# Patient Record
Sex: MALE | Race: BLACK OR AFRICAN AMERICAN | NOT HISPANIC OR LATINO | Employment: OTHER | URBAN - METROPOLITAN AREA
[De-identification: names, ages, dates, MRNs, and addresses within clinical notes are randomized per-mention and may not be internally consistent; named-entity substitution may affect disease eponyms.]

---

## 2017-03-10 RX ORDER — HYDROCHLOROTHIAZIDE 12.5 MG/1
12.5 CAPSULE, GELATIN COATED ORAL DAILY
Qty: 90 CAPSULE | Refills: 4 | Status: SHIPPED | OUTPATIENT
Start: 2017-03-10 | End: 2020-12-30 | Stop reason: SDUPTHER

## 2017-03-10 RX ORDER — LOSARTAN POTASSIUM 50 MG/1
50 TABLET ORAL DAILY
Qty: 90 TABLET | Refills: 4 | Status: SHIPPED | OUTPATIENT
Start: 2017-03-10 | End: 2017-11-16 | Stop reason: SDUPTHER

## 2017-05-11 ENCOUNTER — OFFICE VISIT (OUTPATIENT)
Dept: ONCOLOGY | Facility: CLINIC | Age: 62
End: 2017-05-11

## 2017-05-11 ENCOUNTER — LAB (OUTPATIENT)
Dept: LAB | Facility: HOSPITAL | Age: 62
End: 2017-05-11

## 2017-05-11 VITALS
WEIGHT: 173 LBS | BODY MASS INDEX: 23.43 KG/M2 | SYSTOLIC BLOOD PRESSURE: 104 MMHG | HEART RATE: 77 BPM | TEMPERATURE: 97.2 F | DIASTOLIC BLOOD PRESSURE: 81 MMHG | RESPIRATION RATE: 16 BRPM | HEIGHT: 72 IN

## 2017-05-11 DIAGNOSIS — C84.10 SEZARY'S DISEASE, UNSPECIFIED BODY REGION (HCC): ICD-10-CM

## 2017-05-11 DIAGNOSIS — C84.10 SEZARY'S DISEASE, UNSPECIFIED BODY REGION (HCC): Primary | ICD-10-CM

## 2017-05-11 LAB
ALBUMIN SERPL-MCNC: 3.8 G/DL (ref 3.2–4.8)
ALBUMIN/GLOB SERPL: 1.5 G/DL (ref 1.5–2.5)
ALP SERPL-CCNC: 62 U/L (ref 25–100)
ALT SERPL W P-5'-P-CCNC: 14 U/L (ref 7–40)
ANION GAP SERPL CALCULATED.3IONS-SCNC: 6 MMOL/L (ref 3–11)
AST SERPL-CCNC: 19 U/L (ref 0–33)
BILIRUB SERPL-MCNC: 0.7 MG/DL (ref 0.3–1.2)
BUN BLD-MCNC: 8 MG/DL (ref 9–23)
BUN/CREAT SERPL: 10 (ref 7–25)
CALCIUM SPEC-SCNC: 9.7 MG/DL (ref 8.7–10.4)
CHLORIDE SERPL-SCNC: 104 MMOL/L (ref 99–109)
CO2 SERPL-SCNC: 29 MMOL/L (ref 20–31)
CREAT BLD-MCNC: 0.8 MG/DL (ref 0.6–1.3)
ERYTHROCYTE [DISTWIDTH] IN BLOOD BY AUTOMATED COUNT: 14.2 % (ref 11.3–14.5)
GFR SERPL CREATININE-BSD FRML MDRD: 118 ML/MIN/1.73
GLOBULIN UR ELPH-MCNC: 2.5 GM/DL
GLUCOSE BLD-MCNC: 86 MG/DL (ref 70–100)
HCT VFR BLD AUTO: 37.6 % (ref 38.9–50.9)
HGB BLD-MCNC: 12.1 G/DL (ref 13.1–17.5)
LYMPHOCYTES # BLD AUTO: 1.2 10*3/MM3 (ref 0.6–4.8)
LYMPHOCYTES NFR BLD AUTO: 23.6 % (ref 24–44)
MCH RBC QN AUTO: 29.9 PG (ref 27–31)
MCHC RBC AUTO-ENTMCNC: 32.2 G/DL (ref 32–36)
MCV RBC AUTO: 92.7 FL (ref 80–99)
MONOCYTES # BLD AUTO: 0.4 10*3/MM3 (ref 0–1)
MONOCYTES NFR BLD AUTO: 8.3 % (ref 0–12)
NEUTROPHILS # BLD AUTO: 3.3 10*3/MM3 (ref 1.5–8.3)
NEUTROPHILS NFR BLD AUTO: 68.1 % (ref 41–71)
PLATELET # BLD AUTO: 238 10*3/MM3 (ref 150–450)
PMV BLD AUTO: 7.8 FL (ref 6–12)
POTASSIUM BLD-SCNC: 4 MMOL/L (ref 3.5–5.5)
PROT SERPL-MCNC: 6.3 G/DL (ref 5.7–8.2)
RBC # BLD AUTO: 4.05 10*6/MM3 (ref 4.2–5.76)
SODIUM BLD-SCNC: 139 MMOL/L (ref 132–146)
WBC NRBC COR # BLD: 4.9 10*3/MM3 (ref 3.5–10.8)

## 2017-05-11 PROCEDURE — 85025 COMPLETE CBC W/AUTO DIFF WBC: CPT

## 2017-05-11 PROCEDURE — 99213 OFFICE O/P EST LOW 20 MIN: CPT | Performed by: INTERNAL MEDICINE

## 2017-05-11 PROCEDURE — 36415 COLL VENOUS BLD VENIPUNCTURE: CPT

## 2017-05-11 PROCEDURE — 80053 COMPREHEN METABOLIC PANEL: CPT | Performed by: INTERNAL MEDICINE

## 2017-05-11 RX ORDER — DORZOLAMIDE HYDROCHLORIDE AND TIMOLOL MALEATE 20; 5 MG/ML; MG/ML
SOLUTION/ DROPS OPHTHALMIC
Refills: 1 | COMMUNITY
Start: 2017-03-21

## 2017-11-16 ENCOUNTER — APPOINTMENT (OUTPATIENT)
Dept: LAB | Facility: HOSPITAL | Age: 62
End: 2017-11-16

## 2017-11-16 ENCOUNTER — OFFICE VISIT (OUTPATIENT)
Dept: ONCOLOGY | Facility: CLINIC | Age: 62
End: 2017-11-16

## 2017-11-16 VITALS
WEIGHT: 178 LBS | DIASTOLIC BLOOD PRESSURE: 91 MMHG | SYSTOLIC BLOOD PRESSURE: 167 MMHG | BODY MASS INDEX: 24.11 KG/M2 | HEART RATE: 82 BPM | RESPIRATION RATE: 16 BRPM | HEIGHT: 72 IN | TEMPERATURE: 97.7 F

## 2017-11-16 DIAGNOSIS — C84.10 SEZARY'S DISEASE, UNSPECIFIED BODY REGION (HCC): Primary | ICD-10-CM

## 2017-11-16 LAB
ALBUMIN SERPL-MCNC: 4.1 G/DL (ref 3.2–4.8)
ALBUMIN/GLOB SERPL: 1.5 G/DL (ref 1.5–2.5)
ALP SERPL-CCNC: 105 U/L (ref 25–100)
ALT SERPL W P-5'-P-CCNC: 18 U/L (ref 7–40)
ANION GAP SERPL CALCULATED.3IONS-SCNC: 6 MMOL/L (ref 3–11)
AST SERPL-CCNC: 23 U/L (ref 0–33)
BILIRUB SERPL-MCNC: 0.6 MG/DL (ref 0.3–1.2)
BUN BLD-MCNC: 10 MG/DL (ref 9–23)
BUN/CREAT SERPL: 11.1 (ref 7–25)
CALCIUM SPEC-SCNC: 9.1 MG/DL (ref 8.7–10.4)
CHLORIDE SERPL-SCNC: 107 MMOL/L (ref 99–109)
CO2 SERPL-SCNC: 27 MMOL/L (ref 20–31)
CREAT BLD-MCNC: 0.9 MG/DL (ref 0.6–1.3)
ERYTHROCYTE [DISTWIDTH] IN BLOOD BY AUTOMATED COUNT: 13.3 % (ref 11.3–14.5)
GFR SERPL CREATININE-BSD FRML MDRD: 103 ML/MIN/1.73
GLOBULIN UR ELPH-MCNC: 2.8 GM/DL
GLUCOSE BLD-MCNC: 76 MG/DL (ref 70–100)
HCT VFR BLD AUTO: 42 % (ref 38.9–50.9)
HGB BLD-MCNC: 13.4 G/DL (ref 13.1–17.5)
LDH SERPL-CCNC: 193 U/L (ref 120–246)
LYMPHOCYTES # BLD AUTO: 1.2 10*3/MM3 (ref 0.6–4.8)
LYMPHOCYTES NFR BLD AUTO: 24.1 % (ref 24–44)
MCH RBC QN AUTO: 29.2 PG (ref 27–31)
MCHC RBC AUTO-ENTMCNC: 31.8 G/DL (ref 32–36)
MCV RBC AUTO: 91.7 FL (ref 80–99)
MONOCYTES # BLD AUTO: 0.2 10*3/MM3 (ref 0–1)
MONOCYTES NFR BLD AUTO: 3 % (ref 0–12)
NEUTROPHILS # BLD AUTO: 3.6 10*3/MM3 (ref 1.5–8.3)
NEUTROPHILS NFR BLD AUTO: 72.9 % (ref 41–71)
PLATELET # BLD AUTO: 194 10*3/MM3 (ref 150–450)
PMV BLD AUTO: 7.9 FL (ref 6–12)
POTASSIUM BLD-SCNC: 4.2 MMOL/L (ref 3.5–5.5)
PROT SERPL-MCNC: 6.9 G/DL (ref 5.7–8.2)
RBC # BLD AUTO: 4.58 10*6/MM3 (ref 4.2–5.76)
SODIUM BLD-SCNC: 140 MMOL/L (ref 132–146)
WBC NRBC COR # BLD: 5 10*3/MM3 (ref 3.5–10.8)

## 2017-11-16 PROCEDURE — 36415 COLL VENOUS BLD VENIPUNCTURE: CPT | Performed by: INTERNAL MEDICINE

## 2017-11-16 PROCEDURE — 99213 OFFICE O/P EST LOW 20 MIN: CPT | Performed by: INTERNAL MEDICINE

## 2017-11-16 PROCEDURE — 80053 COMPREHEN METABOLIC PANEL: CPT | Performed by: INTERNAL MEDICINE

## 2017-11-16 PROCEDURE — 83615 LACTATE (LD) (LDH) ENZYME: CPT | Performed by: INTERNAL MEDICINE

## 2017-11-16 PROCEDURE — 85025 COMPLETE CBC W/AUTO DIFF WBC: CPT | Performed by: INTERNAL MEDICINE

## 2017-11-16 RX ORDER — LOSARTAN POTASSIUM 50 MG/1
50 TABLET ORAL DAILY
Qty: 90 TABLET | Refills: 4 | Status: SHIPPED | OUTPATIENT
Start: 2017-11-16 | End: 2019-01-02 | Stop reason: SDUPTHER

## 2017-11-16 NOTE — PROGRESS NOTES
"PROBLEM LIST:  Oncology/Hematology History    1. 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   2. Completed radiotherapy to the whole body by Dr. Lujan at the [facility] in 01/2014.   3. Progressed on methotrexate and weekly dexamethasone.   4. Completed 4 months of bexarotene with continued response.   5. Skin changes related to treatment.        Erythroderma, Sezary    10/14/2013 Initial Diagnosis     Erythroderma, Sezary            REASON FOR VISIT: Follow-up of my T-cell lymphoma    HISTORY OF PRESENT ILLNESS:   62-year-old gentleman with Sezary syndrome presents today for follow-up.  He is  3.5 years from completion of radiotherapy.  He is now over 3 years out from completion of bexarotene.  His skin continues to improve slowly.  Otherwise clinically doing reasonably well.  Still concerned of contractures  In his hands.  Denies any issues regarding infections.  No night sweats or lymphadenopathy.      Past medical history, social history and family history was reviewed and unchanged from prior visit.    Review of Systems:    Review of Systems   Constitutional: Positive for fatigue.   HENT: Negative.    Eyes: Positive for redness.   Respiratory: Negative.    Cardiovascular: Negative.    Gastrointestinal: Negative.    Endocrine: Negative.    Genitourinary: Negative.    Musculoskeletal: Positive for arthralgias.   Skin: Positive for color change and wound.   Allergic/Immunologic: Negative.    Neurological: Negative.    Hematological: Negative.       A comprehensive 14 point review of systems was performed and was negative except as mentioned.      Medications:  The current medication list was reviewed in the EMR    ALLERGIES:  No Known Allergies      Physical Exam    VITAL SIGNS:  /91 Comment: LUE  Pulse 82  Temp 97.7 °F (36.5 °C) (Temporal Artery )   Resp 16  Ht 72\" (182.9 cm)  Wt 178 lb (80.7 kg)  BMI 24.14 kg/m2     Performance " Status: 1    General:  in no acute distress  HEENT: sclera anicteric, oropharynx clear, neck is supple  Lymphatics: no cervical, supraclavicular, or axillary adenopathy  Cardiovascular: regular rate and rhythm, no murmurs, rubs or gallops  Lungs: clear to auscultation bilaterally  Abdomen: soft, nontender, nondistended.  No palpable organomegaly  Extremities: no lower extremity edema  Skin: Significant erythroderma in his hands with peeling minimal flaking and the rest of his body.  Msk:  Shows no weakness of the large muscle groups  Psych: Mood is stable        RECENT LABS:    Lab Results   Component Value Date    WBC 5.00 11/16/2017    HGB 13.4 11/16/2017    HCT 42.0 11/16/2017    MCV 91.7 11/16/2017     11/16/2017     Lab Results   Component Value Date    GLUCOSE 76 11/16/2017    BUN 10 11/16/2017    CREATININE 0.90 11/16/2017    EGFRIFAFRI 103 11/16/2017    BCR 11.1 11/16/2017    K 4.2 11/16/2017    CO2 27.0 11/16/2017    CALCIUM 9.1 11/16/2017    ALBUMIN 4.10 11/16/2017    LABIL2 1.5 11/16/2017    AST 23 11/16/2017    ALT 18 11/16/2017     Assessment/Plan    62-year-old gentleman with Sezary syndrome.   He keeps improving. I don't see a need to start him on any treatment at this time.  Skin seems to be doing reasonably well.  He doesn't have any new areas of concern.  He does have his hands which seem to be improving though still with contractures.  I'll see him back in clinic in 6 months.      Neri Matute MD  [facility] Hematology and Oncology    11/16/2017

## 2017-12-06 ENCOUNTER — TELEPHONE (OUTPATIENT)
Dept: ONCOLOGY | Facility: CLINIC | Age: 62
End: 2017-12-06

## 2017-12-06 NOTE — TELEPHONE ENCOUNTER
----- Message from Chely Howard sent at 12/6/2017 11:37 AM EST -----  Regarding: STACY - MEDICATION VERIFICATION   Contact: [phone]  PATIENT NEEDS TO VERIFY WHAT HIS BLOOD PRESSURE MEDICINE MG SHOULD BE BECAUSE HE FEELS THE LAST RX WASN'T CORRECT.

## 2018-05-16 ENCOUNTER — LAB (OUTPATIENT)
Dept: LAB | Facility: HOSPITAL | Age: 63
End: 2018-05-16

## 2018-05-16 ENCOUNTER — OFFICE VISIT (OUTPATIENT)
Dept: ONCOLOGY | Facility: CLINIC | Age: 63
End: 2018-05-16

## 2018-05-16 VITALS
RESPIRATION RATE: 16 BRPM | BODY MASS INDEX: 25.77 KG/M2 | TEMPERATURE: 98.2 F | HEART RATE: 68 BPM | SYSTOLIC BLOOD PRESSURE: 131 MMHG | WEIGHT: 190 LBS | DIASTOLIC BLOOD PRESSURE: 84 MMHG

## 2018-05-16 DIAGNOSIS — C84.10 SEZARY'S DISEASE, UNSPECIFIED BODY REGION (HCC): ICD-10-CM

## 2018-05-16 DIAGNOSIS — C84.10 SEZARY'S DISEASE, UNSPECIFIED BODY REGION (HCC): Primary | ICD-10-CM

## 2018-05-16 LAB
ALBUMIN SERPL-MCNC: 4 G/DL (ref 3.2–4.8)
ALBUMIN/GLOB SERPL: 1.4 G/DL (ref 1.5–2.5)
ALP SERPL-CCNC: 105 U/L (ref 25–100)
ALT SERPL W P-5'-P-CCNC: 12 U/L (ref 7–40)
ANION GAP SERPL CALCULATED.3IONS-SCNC: 5 MMOL/L (ref 3–11)
AST SERPL-CCNC: 16 U/L (ref 0–33)
BILIRUB SERPL-MCNC: 0.6 MG/DL (ref 0.3–1.2)
BUN BLD-MCNC: 12 MG/DL (ref 9–23)
BUN/CREAT SERPL: 12 (ref 7–25)
CALCIUM SPEC-SCNC: 8.9 MG/DL (ref 8.7–10.4)
CHLORIDE SERPL-SCNC: 108 MMOL/L (ref 99–109)
CO2 SERPL-SCNC: 24 MMOL/L (ref 20–31)
CREAT BLD-MCNC: 1 MG/DL (ref 0.6–1.3)
ERYTHROCYTE [DISTWIDTH] IN BLOOD BY AUTOMATED COUNT: 13.4 % (ref 11.3–14.5)
GFR SERPL CREATININE-BSD FRML MDRD: 91 ML/MIN/1.73
GLOBULIN UR ELPH-MCNC: 2.9 GM/DL
GLUCOSE BLD-MCNC: 100 MG/DL (ref 70–100)
HCT VFR BLD AUTO: 43.5 % (ref 38.9–50.9)
HGB BLD-MCNC: 13.9 G/DL (ref 13.1–17.5)
LDH SERPL-CCNC: 138 U/L (ref 120–246)
LYMPHOCYTES # BLD AUTO: 1.2 10*3/MM3 (ref 0.6–4.8)
LYMPHOCYTES NFR BLD AUTO: 24.5 % (ref 24–44)
MCH RBC QN AUTO: 28.9 PG (ref 27–31)
MCHC RBC AUTO-ENTMCNC: 32 G/DL (ref 32–36)
MCV RBC AUTO: 90 FL (ref 80–99)
MONOCYTES # BLD AUTO: 0.4 10*3/MM3 (ref 0–1)
MONOCYTES NFR BLD AUTO: 7.7 % (ref 0–12)
NEUTROPHILS # BLD AUTO: 3.2 10*3/MM3 (ref 1.5–8.3)
NEUTROPHILS NFR BLD AUTO: 67.8 % (ref 41–71)
PLATELET # BLD AUTO: 234 10*3/MM3 (ref 150–450)
PMV BLD AUTO: 8.1 FL (ref 6–12)
POTASSIUM BLD-SCNC: 4.3 MMOL/L (ref 3.5–5.5)
PROT SERPL-MCNC: 6.9 G/DL (ref 5.7–8.2)
RBC # BLD AUTO: 4.83 10*6/MM3 (ref 4.2–5.76)
SODIUM BLD-SCNC: 137 MMOL/L (ref 132–146)
WBC NRBC COR # BLD: 4.7 10*3/MM3 (ref 3.5–10.8)

## 2018-05-16 PROCEDURE — 36415 COLL VENOUS BLD VENIPUNCTURE: CPT

## 2018-05-16 PROCEDURE — 80053 COMPREHEN METABOLIC PANEL: CPT

## 2018-05-16 PROCEDURE — 85025 COMPLETE CBC W/AUTO DIFF WBC: CPT

## 2018-05-16 PROCEDURE — 83615 LACTATE (LD) (LDH) ENZYME: CPT

## 2018-05-16 PROCEDURE — 99213 OFFICE O/P EST LOW 20 MIN: CPT | Performed by: INTERNAL MEDICINE

## 2018-05-16 NOTE — PROGRESS NOTES
PROBLEM LIST:  Oncology/Hematology History    1. 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   2. Completed radiotherapy to the whole body by Dr. Lujan at the [facility] in 01/2014.   3. Progressed on methotrexate and weekly dexamethasone.   4. Completed 4 months of bexarotene with continued response.   5. Skin changes related to treatment.        Erythroderma, zary    10/14/2013 Initial Diagnosis     Erythroderma, Sezary            REASON FOR VISIT: Follow-up of my T-cell lymphoma    HISTORY OF PRESENT ILLNESS:   63-year-old gentleman with Sezary syndrome presents today for follow-up.  He is  4 years from completion of radiotherapy.  He is now over 3.5 years out from completion of bexarotene.  His skin continues to improve slowly.  Otherwise clinically doing reasonably well.  Still concerned of contractures  In his handsThough this is improving.  Denies any issues regarding infections.  No night sweats or lymphadenopathy.      Past medical history, social history and family history was reviewed and unchanged from prior visit.    Review of Systems:    Review of Systems   Constitutional: Positive for fatigue.   HENT: Negative.    Eyes: Positive for redness.   Respiratory: Negative.    Cardiovascular: Negative.    Gastrointestinal: Negative.    Endocrine: Negative.    Genitourinary: Negative.    Musculoskeletal: Positive for arthralgias.   Skin: Positive for color change and wound.   Allergic/Immunologic: Negative.    Neurological: Negative.    Hematological: Negative.       A comprehensive 14 point review of systems was performed and was negative except as mentioned.      Medications:  The current medication list was reviewed in the EMR    ALLERGIES:  No Known Allergies      Physical Exam    VITAL SIGNS:  /84   Pulse 68   Temp 98.2 °F (36.8 °C) (Temporal Artery )   Resp 16   Wt 86.2 kg (190 lb)   BMI 25.77 kg/m²      Performance Status:  1    General:  in no acute distress  HEENT: sclera anicteric, oropharynx clear, neck is supple  Lymphatics: no cervical, supraclavicular, or axillary adenopathy  Cardiovascular: regular rate and rhythm, no murmurs, rubs or gallops  Lungs: clear to auscultation bilaterally  Abdomen: soft, nontender, nondistended.  No palpable organomegaly  Extremities: no lower extremity edema  Skin: Significant erythroderma in his hands with peeling minimal flaking and the rest of his body.  Msk:  Shows no weakness of the large muscle groups  Psych: Mood is stable        RECENT LABS:    Lab Results   Component Value Date    WBC 4.70 05/16/2018    HGB 13.9 05/16/2018    HCT 43.5 05/16/2018    MCV 90.0 05/16/2018     05/16/2018     Lab Results   Component Value Date    GLUCOSE 76 11/16/2017    BUN 10 11/16/2017    CREATININE 0.90 11/16/2017    EGFRIFAFRI 103 11/16/2017    BCR 11.1 11/16/2017    K 4.2 11/16/2017    CO2 27.0 11/16/2017    CALCIUM 9.1 11/16/2017    ALBUMIN 4.10 11/16/2017    LABIL2 1.5 11/16/2017    AST 23 11/16/2017    ALT 18 11/16/2017     Assessment/Plan    63-year-old gentleman with Sezary syndrome.   He keeps improving. I don't see a need to start him on any treatment at this time.  Skin seems to be doing reasonably well.  He doesn't have any new areas of concern. His vitiligo seems to be going away.  I'll see him back in clinic in 6 months.      Neri Matute MD  [facility] Hematology and Oncology    5/16/2018     Return on: 11/28/18  Return in (Approximately): 6 months

## 2018-11-28 ENCOUNTER — OFFICE VISIT (OUTPATIENT)
Dept: ONCOLOGY | Facility: CLINIC | Age: 63
End: 2018-11-28

## 2018-11-28 ENCOUNTER — LAB (OUTPATIENT)
Dept: LAB | Facility: HOSPITAL | Age: 63
End: 2018-11-28

## 2018-11-28 VITALS
DIASTOLIC BLOOD PRESSURE: 94 MMHG | WEIGHT: 191 LBS | BODY MASS INDEX: 25.87 KG/M2 | TEMPERATURE: 98.7 F | HEIGHT: 72 IN | HEART RATE: 80 BPM | RESPIRATION RATE: 16 BRPM | SYSTOLIC BLOOD PRESSURE: 130 MMHG | OXYGEN SATURATION: 99 %

## 2018-11-28 DIAGNOSIS — C84.10 SEZARY'S DISEASE, UNSPECIFIED BODY REGION (HCC): Primary | ICD-10-CM

## 2018-11-28 DIAGNOSIS — C84.A9: ICD-10-CM

## 2018-11-28 DIAGNOSIS — C84.10 SEZARY'S DISEASE, UNSPECIFIED BODY REGION (HCC): ICD-10-CM

## 2018-11-28 LAB
ALBUMIN SERPL-MCNC: 4.17 G/DL (ref 3.2–4.8)
ALBUMIN/GLOB SERPL: 1.7 G/DL (ref 1.5–2.5)
ALP SERPL-CCNC: 87 U/L (ref 25–100)
ALT SERPL W P-5'-P-CCNC: 11 U/L (ref 7–40)
ANION GAP SERPL CALCULATED.3IONS-SCNC: 3 MMOL/L (ref 3–11)
AST SERPL-CCNC: 20 U/L (ref 0–33)
BILIRUB SERPL-MCNC: 0.7 MG/DL (ref 0.3–1.2)
BUN BLD-MCNC: 18 MG/DL (ref 9–23)
BUN/CREAT SERPL: 16.8 (ref 7–25)
CALCIUM SPEC-SCNC: 8.8 MG/DL (ref 8.7–10.4)
CHLORIDE SERPL-SCNC: 111 MMOL/L (ref 99–109)
CO2 SERPL-SCNC: 24 MMOL/L (ref 20–31)
CREAT BLD-MCNC: 1.07 MG/DL (ref 0.6–1.3)
ERYTHROCYTE [DISTWIDTH] IN BLOOD BY AUTOMATED COUNT: 13.3 % (ref 11.3–14.5)
GFR SERPL CREATININE-BSD FRML MDRD: 84 ML/MIN/1.73
GLOBULIN UR ELPH-MCNC: 2.4 GM/DL
GLUCOSE BLD-MCNC: 83 MG/DL (ref 70–100)
HCT VFR BLD AUTO: 43.5 % (ref 38.9–50.9)
HGB BLD-MCNC: 14.3 G/DL (ref 13.1–17.5)
LDH SERPL-CCNC: 182 U/L (ref 120–246)
LYMPHOCYTES # BLD AUTO: 1 10*3/MM3 (ref 0.6–4.8)
LYMPHOCYTES NFR BLD AUTO: 12.4 % (ref 24–44)
MCH RBC QN AUTO: 29.9 PG (ref 27–31)
MCHC RBC AUTO-ENTMCNC: 32.9 G/DL (ref 32–36)
MCV RBC AUTO: 90.9 FL (ref 80–99)
MONOCYTES # BLD AUTO: 2.7 10*3/MM3 (ref 0–1)
MONOCYTES NFR BLD AUTO: 32.9 % (ref 0–12)
NEUTROPHILS # BLD AUTO: 4.5 10*3/MM3 (ref 1.5–8.3)
NEUTROPHILS NFR BLD AUTO: 54.7 % (ref 41–71)
PLATELET # BLD AUTO: 210 10*3/MM3 (ref 150–450)
PMV BLD AUTO: 7.6 FL (ref 6–12)
POTASSIUM BLD-SCNC: 4.3 MMOL/L (ref 3.5–5.5)
PROT SERPL-MCNC: 6.6 G/DL (ref 5.7–8.2)
RBC # BLD AUTO: 4.79 10*6/MM3 (ref 4.2–5.76)
SODIUM BLD-SCNC: 138 MMOL/L (ref 132–146)
WBC NRBC COR # BLD: 8.3 10*3/MM3 (ref 3.5–10.8)

## 2018-11-28 PROCEDURE — 99215 OFFICE O/P EST HI 40 MIN: CPT | Performed by: INTERNAL MEDICINE

## 2018-11-28 PROCEDURE — 36415 COLL VENOUS BLD VENIPUNCTURE: CPT

## 2018-11-28 PROCEDURE — 80053 COMPREHEN METABOLIC PANEL: CPT

## 2018-11-28 PROCEDURE — 83615 LACTATE (LD) (LDH) ENZYME: CPT

## 2018-11-28 PROCEDURE — 85025 COMPLETE CBC W/AUTO DIFF WBC: CPT

## 2018-11-28 RX ORDER — ONDANSETRON HYDROCHLORIDE 8 MG/1
8 TABLET, FILM COATED ORAL 3 TIMES DAILY PRN
Qty: 30 TABLET | Refills: 5 | Status: SHIPPED | OUTPATIENT
Start: 2018-11-28 | End: 2019-01-02

## 2018-11-28 RX ORDER — SILDENAFIL 50 MG/1
50 TABLET, FILM COATED ORAL DAILY PRN
Qty: 10 TABLET | Refills: 5 | Status: SHIPPED | OUTPATIENT
Start: 2018-11-28 | End: 2020-12-30

## 2018-11-28 RX ORDER — SODIUM CHLORIDE 9 MG/ML
250 INJECTION, SOLUTION INTRAVENOUS ONCE
Status: CANCELLED | OUTPATIENT
Start: 2018-12-12

## 2018-11-28 NOTE — PROGRESS NOTES
"PROBLEM LIST:  Oncology/Hematology History    1. 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 Completed radiotherapy to the whole body by Dr. Lujan at the [facility] in 01/2014.   3. Progressed on methotrexate and weekly dexamethasone.   4. Completed 4 months of bexarotene with continued response. In 2014.  5. Skin changes related to treatment.        Erythroderma, Sezary (CMS/[facility])    10/14/2013 Initial Diagnosis     Erythroderma, Sezary            REASON FOR VISIT: Follow-up of my T-cell lymphoma    HISTORY OF PRESENT ILLNESS:   63-year-old gentleman with Sezary syndrome presents today for follow-up.  He is now 5 years out from his diagnosis.  Unfortunately he has progressive disease noted in the last 6 months.  Patient did not report to us and comes in with fair bit of skin changes.  He denies any lymphadenopathy.  No recent infections.  He is having significant sloughing of his skin.      Past medical history, social history and family history was reviewed and unchanged from prior visit.    Review of Systems:    Review of Systems   Constitutional: Positive for fatigue.   HENT: Negative.    Eyes: Positive for redness.   Respiratory: Negative.    Cardiovascular: Negative.    Gastrointestinal: Negative.    Endocrine: Negative.    Genitourinary: Negative.    Musculoskeletal: Positive for arthralgias.   Skin: Positive for color change, rash and wound.   Allergic/Immunologic: Negative.    Neurological: Negative.    Hematological: Negative.       A comprehensive 14 point review of systems was performed and was negative except as mentioned.      Medications:  The current medication list was reviewed in the EMR    ALLERGIES:  No Known Allergies      Physical Exam    VITAL SIGNS:  /94 Comment: LUE  Pulse 80   Temp 98.7 °F (37.1 °C) (Temporal)   Resp 16   Ht 182.9 cm (72\")   Wt 86.6 kg (191 lb)   SpO2 99% Comment: RA  BMI 25.90 kg/m² "      Performance Status: 1    General:  in no acute distress  HEENT: sclera anicteric, oropharynx clear, neck is supple  Lymphatics: no cervical, supraclavicular, or axillary adenopathy  Cardiovascular: regular rate and rhythm, no murmurs, rubs or gallops  Lungs: clear to auscultation bilaterally  Abdomen: soft, nontender, nondistended.  No palpable organomegaly  Extremities: no lower extremity edema  Skin: Significant erythroderma in his hands with peeling minimal flaking in the rest of his body.  Msk:  Shows no weakness of the large muscle groups  Psych: Mood is stable        RECENT LABS:    Lab Results   Component Value Date    WBC 8.30 11/28/2018    HGB 14.3 11/28/2018    HCT 43.5 11/28/2018    MCV 90.9 11/28/2018     11/28/2018     Lab Results   Component Value Date    GLUCOSE 83 11/28/2018    BUN 18 11/28/2018    CREATININE 1.07 11/28/2018    EGFRIFAFRI 84 11/28/2018    BCR 16.8 11/28/2018    K 4.3 11/28/2018    CO2 24.0 11/28/2018    CALCIUM 8.8 11/28/2018    ALBUMIN 4.17 11/28/2018    AST 20 11/28/2018    ALT 11 11/28/2018     Assessment/Plan    1. Stage IVA1 Sezary syndrome.  He appears to have progressive disease after 2 lines of therapy.  He has not had any treatment in almost 4 years.  I'm going to get a PET scan to make sure he does not have involvement of the viscera.  I'm going to start him on pentoxifylline lab which is a  [id number] inhibitor.  I picked this drug because it has the best response rates compared to the other drugs that is approved in this setting.  However unfortunately has significant neuropathy risk.  I will see him back in my clinic in 2 weeks to start him on treatment.  I'll discuss with him the side effects of the medication and consented him at that time.  We had a long discussion about options going forward.  Unfortunately he is not very interested in traveling for clinical trials.    2.  Sexual dysfunction: Patient wants to try Viagra. we will order that for  him.    I spent 40 minutes on the patient's plan and care with more than 50% of time spent counseling the patient.    Neri Matute MD  [facility] Hematology and Oncology    11/28/2018     Return in (Approximately): 2 weeks

## 2018-12-12 ENCOUNTER — OFFICE VISIT (OUTPATIENT)
Dept: ONCOLOGY | Facility: CLINIC | Age: 63
End: 2018-12-12

## 2018-12-12 ENCOUNTER — DOCUMENTATION (OUTPATIENT)
Dept: NUTRITION | Facility: HOSPITAL | Age: 63
End: 2018-12-12

## 2018-12-12 ENCOUNTER — HOSPITAL ENCOUNTER (OUTPATIENT)
Dept: ONCOLOGY | Facility: HOSPITAL | Age: 63
Setting detail: INFUSION SERIES
Discharge: HOME | End: 2018-12-12

## 2018-12-12 ENCOUNTER — HOSPITAL ENCOUNTER (OUTPATIENT)
Dept: ONCOLOGY | Facility: HOSPITAL | Age: 63
Discharge: HOME | End: 2018-12-12
Admitting: INTERNAL MEDICINE

## 2018-12-12 ENCOUNTER — LAB (OUTPATIENT)
Dept: LAB | Facility: HOSPITAL | Age: 63
End: 2018-12-12

## 2018-12-12 ENCOUNTER — EDUCATION (OUTPATIENT)
Dept: ONCOLOGY | Facility: HOSPITAL | Age: 63
End: 2018-12-12

## 2018-12-12 VITALS
SYSTOLIC BLOOD PRESSURE: 133 MMHG | TEMPERATURE: 98.5 F | WEIGHT: 188 LBS | HEART RATE: 75 BPM | OXYGEN SATURATION: 100 % | BODY MASS INDEX: 25.47 KG/M2 | HEIGHT: 72 IN | DIASTOLIC BLOOD PRESSURE: 92 MMHG | RESPIRATION RATE: 16 BRPM

## 2018-12-12 DIAGNOSIS — C84.10 SEZARY'S DISEASE, UNSPECIFIED BODY REGION (HCC): Primary | ICD-10-CM

## 2018-12-12 DIAGNOSIS — C84.10 SEZARY'S DISEASE, UNSPECIFIED BODY REGION (HCC): ICD-10-CM

## 2018-12-12 LAB
ALBUMIN SERPL-MCNC: 4.26 G/DL (ref 3.2–4.8)
ALBUMIN/GLOB SERPL: 1.8 G/DL (ref 1.5–2.5)
ALP SERPL-CCNC: 88 U/L (ref 25–100)
ALT SERPL W P-5'-P-CCNC: 10 U/L (ref 7–40)
ANION GAP SERPL CALCULATED.3IONS-SCNC: 8 MMOL/L (ref 3–11)
AST SERPL-CCNC: 21 U/L (ref 0–33)
BILIRUB SERPL-MCNC: 0.7 MG/DL (ref 0.3–1.2)
BUN BLD-MCNC: 9 MG/DL (ref 9–23)
BUN/CREAT SERPL: 9.5 (ref 7–25)
CALCIUM SPEC-SCNC: 9.1 MG/DL (ref 8.7–10.4)
CHLORIDE SERPL-SCNC: 108 MMOL/L (ref 99–109)
CO2 SERPL-SCNC: 25 MMOL/L (ref 20–31)
CREAT BLD-MCNC: 0.95 MG/DL (ref 0.6–1.3)
CREAT BLDA-MCNC: 1 MG/DL (ref 0.6–1.3)
ERYTHROCYTE [DISTWIDTH] IN BLOOD BY AUTOMATED COUNT: 13.8 % (ref 11.3–14.5)
GFR SERPL CREATININE-BSD FRML MDRD: 97 ML/MIN/1.73
GLOBULIN UR ELPH-MCNC: 2.3 GM/DL
GLUCOSE BLD-MCNC: 89 MG/DL (ref 70–100)
HCT VFR BLD AUTO: 42.8 % (ref 38.9–50.9)
HGB BLD-MCNC: 14.2 G/DL (ref 13.1–17.5)
LYMPHOCYTES # BLD AUTO: 0.9 10*3/MM3 (ref 0.6–4.8)
LYMPHOCYTES NFR BLD AUTO: 11.6 % (ref 24–44)
MCH RBC QN AUTO: 30.3 PG (ref 27–31)
MCHC RBC AUTO-ENTMCNC: 33.2 G/DL (ref 32–36)
MCV RBC AUTO: 91.1 FL (ref 80–99)
MONOCYTES # BLD AUTO: 1.9 10*3/MM3 (ref 0–1)
MONOCYTES NFR BLD AUTO: 23.1 % (ref 0–12)
NEUTROPHILS # BLD AUTO: 5.3 10*3/MM3 (ref 1.5–8.3)
NEUTROPHILS NFR BLD AUTO: 65.3 % (ref 41–71)
PLATELET # BLD AUTO: 207 10*3/MM3 (ref 150–450)
PMV BLD AUTO: 7.5 FL (ref 6–12)
POTASSIUM BLD-SCNC: 4.2 MMOL/L (ref 3.5–5.5)
PROT SERPL-MCNC: 6.6 G/DL (ref 5.7–8.2)
RBC # BLD AUTO: 4.69 10*6/MM3 (ref 4.2–5.76)
SODIUM BLD-SCNC: 141 MMOL/L (ref 132–146)
WBC NRBC COR # BLD: 8.1 10*3/MM3 (ref 3.5–10.8)

## 2018-12-12 PROCEDURE — 25010000002 BRENTUXIMAB 50 MG RECONSTITUTED SOLUTION 1 EACH VIAL: Performed by: INTERNAL MEDICINE

## 2018-12-12 PROCEDURE — 85025 COMPLETE CBC W/AUTO DIFF WBC: CPT

## 2018-12-12 PROCEDURE — 96413 CHEMO IV INFUSION 1 HR: CPT

## 2018-12-12 PROCEDURE — 25010000002 DEXAMETHASONE PER 1 MG: Performed by: INTERNAL MEDICINE

## 2018-12-12 PROCEDURE — 82565 ASSAY OF CREATININE: CPT

## 2018-12-12 PROCEDURE — 80053 COMPREHEN METABOLIC PANEL: CPT

## 2018-12-12 PROCEDURE — 96375 TX/PRO/DX INJ NEW DRUG ADDON: CPT

## 2018-12-12 PROCEDURE — 99214 OFFICE O/P EST MOD 30 MIN: CPT | Performed by: INTERNAL MEDICINE

## 2018-12-12 RX ORDER — SODIUM CHLORIDE 9 MG/ML
250 INJECTION, SOLUTION INTRAVENOUS ONCE
Status: CANCELLED | OUTPATIENT
Start: 2019-01-02

## 2018-12-12 RX ORDER — SODIUM CHLORIDE 9 MG/ML
250 INJECTION, SOLUTION INTRAVENOUS ONCE
Status: COMPLETED | OUTPATIENT
Start: 2018-12-12 | End: 2018-12-12

## 2018-12-12 RX ADMIN — BRENTUXIMAB VEDOTIN 150 MG: 50 INJECTION, POWDER, LYOPHILIZED, FOR SOLUTION INTRAVENOUS at 12:48

## 2018-12-12 RX ADMIN — DEXAMETHASONE SODIUM PHOSPHATE 12 MG: 4 INJECTION, SOLUTION INTRA-ARTICULAR; INTRALESIONAL; INTRAMUSCULAR; INTRAVENOUS; SOFT TISSUE at 12:19

## 2018-12-12 RX ADMIN — SODIUM CHLORIDE 250 ML: 9 INJECTION, SOLUTION INTRAVENOUS at 12:19

## 2018-12-12 NOTE — PLAN OF CARE
Outpatient Infusion • [address] • [address] • [place] [address] • [phone]      CHEMOTHERAPY EDUCATION SHEET    NAME:  Jose Antonio Do      : 1955           DATE: 18    Booklets Given: Chemotherapy and You []  Eating Hints []    Sexuality/Fertility Books []     Chemotherapy/Biotherapy Education Sheets: (list all that apply)  Brentuximab chemocare sheet provided                                                                                                                                                                 Chemotherapy Regimen:  Brentuximab D1; Q21 days     TOPICS EDUCATION PROVIDED EDUCATION REINFORCED COMMENTS   ANEMIA:  role of RBC, cause, s/s, ways to manage, role of transfusion [x] [] Discussed the potential for fatigue associated with anemia   THROMBOCYTOPENIA:  role of platelet, cause, s/s, ways to prevent bleeding, things to avoid, when to seek help [x] [] Discussed the potential for increased bleeding associated with low platelets    NEUTROPENIA:  role of WBC, cause, infection precautions, s/s of infection, when to call MD [x] [] Discussed the increased infection risk with a low white count. Counseled to watch for signs and symptoms of infection such as fever   NUTRITION & APPETITE CHANGES:  importance of maintaining healthy diet & weight, ways to manage to improve intake, dietary consult, exercise regimen [] []    DIARRHEA:  causes, s/s of dehydration, ways to manage, dietary changes, when to call MD [x] [] Discussed the potential for GI changes with this regimen    CONSTIPATION:  causes, ways to manage, dietary changes, when to call MD [x] []    NAUSEA & VOMITING:  cause, use of antiemetics, dietary changes, when to call MD [x] [] Discussed the minimal emetic risk with this regimen    MOUTH SORES:  causes, oral care, ways to manage [] []    ALOPECIA:  cause, ways to manage, resources [] []    INFERTILITY & SEXUALITY:  causes, fertility preservation options,  sexuality changes, ways to manage, importance of birth control [] []    NERVOUS SYSTEM CHANGES:  causes, s/s, neuropathies, cognitive changes, ways to manage [] []    PAIN:  causes, ways to manage [] [] ????   SKIN & NAIL CHANGES:  cause, s/s, ways to manage [] []    ORGAN TOXICITIES:  cause, s/s, need for diagnostic tests, labs, when to notify MD [] []    SURVIVORSHIP:  distress, distress assessment, secondary malignancies, early/late effects, follow-up, social issues, social support [] []    HOME CARE:  use of spill kits, storing of PO chemo, how to manage bodily fluids [] []    MISCELLANEOUS:  drug interactions, administration, vesicant, et [x] [] Discussed the potential for infusion related reactions      Referrals:        Notes:   Consent obtained 12/12/18. Patient was receptive of the information above and expressed understanding.

## 2018-12-12 NOTE — PROGRESS NOTES
PROBLEM LIST:  Oncology/Hematology History    1. 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 Completed radiotherapy to the whole body by Dr. Lujan at the [facility] in 01/2014.   3. Progressed on methotrexate and weekly dexamethasone.   4. Completed 4 months of bexarotene with continued response. In 2014.  5. Skin changes related to treatment.  6.  Diagnosed by skin biopsy on 8/7/2013 performed at [place].  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  The lymphocytes were predominantly CD3 positive T cells with expression of both CD4 and CD8 stains.  CD30 stain highlights scattered cells within the infiltrate predominantly within the dermis.        Erythroderma, Sezary (CMS/[facility])    10/14/2013 Initial Diagnosis     Erythroderma, Sezary         12/12/2018 -  Chemotherapy     OP Hodgkin LYMPHOMA Brentuximab            Treatment     1. WBRT at  in Jan 2014  2. MTX and Dex   3. Bexarotene x 4 months 2014  4. Brentuximab - Started 12/2018            REASON FOR VISIT: Follow-up of my T-cell lymphoma    HISTORY OF PRESENT ILLNESS:   63-year-old gentleman with Sezary syndrome presents today for follow-up.  He is now 5 years out from his diagnosis.  At our last visit we felt he had progressive disease.  I had recommended Brentuximab.  He will start that today.  Clinically he is doing reasonably well.  Unfortunately he had recent death of his son.  Obviously this is a very emotional time for him.    Past medical history, social history and family history was reviewed and unchanged from prior visit.    Review of Systems:    Review of Systems   Constitutional: Positive for fatigue.   HENT: Negative.    Eyes: Positive for redness.   Respiratory: Negative.    Cardiovascular: Negative.    Gastrointestinal: Negative.    Endocrine: Negative.    Genitourinary: Negative.    Musculoskeletal: Positive  "for arthralgias.   Skin: Positive for color change, rash and wound.   Allergic/Immunologic: Negative.    Neurological: Negative.    Hematological: Negative.       A comprehensive 14 point review of systems was performed and was negative except as mentioned.      Medications:  The current medication list was reviewed in the EMR    ALLERGIES:  No Known Allergies      Physical Exam    VITAL SIGNS:  /92 Comment: Right Wrist  Pulse 75   Temp 98.5 °F (36.9 °C) (Temporal)   Resp 16   Ht 182.9 cm (72\")   Wt 85.3 kg (188 lb)   SpO2 100% Comment: RA  BMI 25.50 kg/m²      Performance Status: 1    General:  in no acute distress  HEENT: sclera anicteric, oropharynx clear, neck is supple  Lymphatics: no cervical, supraclavicular, or axillary adenopathy  Cardiovascular: regular rate and rhythm, no murmurs, rubs or gallops  Lungs: clear to auscultation bilaterally  Abdomen: soft, nontender, nondistended.  No palpable organomegaly  Extremities: no lower extremity edema  Skin: Significant erythroderma in his hands with peeling minimal flaking in the rest of his body.  Msk:  Shows no weakness of the large muscle groups  Psych: Mood is stable        RECENT LABS:    Lab Results   Component Value Date    WBC 8.30 11/28/2018    HGB 14.3 11/28/2018    HCT 43.5 11/28/2018    MCV 90.9 11/28/2018     11/28/2018     Lab Results   Component Value Date    GLUCOSE 83 11/28/2018    BUN 18 11/28/2018    CREATININE 1.07 11/28/2018    EGFRIFAFRI 84 11/28/2018    BCR 16.8 11/28/2018    K 4.3 11/28/2018    CO2 24.0 11/28/2018    CALCIUM 8.8 11/28/2018    ALBUMIN 4.17 11/28/2018    AST 20 11/28/2018    ALT 11 11/28/2018     Assessment/Plan    1. Stage IVA1 Sezary syndrome.  I discuss with him Brentuximab.  The major side effect is neutropenia which occurs in about 80% of the patients.  30% have grade 3 of 4.  We may have to consider growth factor support if he does have significant issues with first cycle.  He also has a risk of " diarrhea and neuropathy.  He is agreeable to get started with treatment today.    2.  Sexual dysfunction: on Viagra    I spent 25 minutes on the patient's plan and care with more than 50% of time spent counseling the patient.    Neri Matute MD  [facility] Hematology and Oncology    12/12/2018     Return on: 01/02/19  Return in (Approximately): 3 weeks

## 2018-12-14 ENCOUNTER — TELEPHONE (OUTPATIENT)
Dept: ONCOLOGY | Facility: CLINIC | Age: 63
End: 2018-12-14

## 2018-12-14 NOTE — TELEPHONE ENCOUNTER
----- Message from Antony Mccallum sent at 12/14/2018  8:15 AM EST -----  Regarding: RE: STACY/spoke with patient about obtaining port access-please advise  Herman Lopes - [facility] Sched[facility] called pt yesterday to schedule his PICC placement and he told the  he did not want a PICC.  Here is the message sent back from CS:    12/13/2018 03:32 PM Interface (Outgoing)     Adrienne Henson, Kenyon Clay     pt doesnt want to get a PICC         ----- Message -----  From: Shaunna Marcelo RN  Sent: 12/12/2018   1:38 PM  To: Antony BRICENO Alessio  Subject: FW: STACY/spoke with patient about obtaining p#    Cinthia Hardy,    I put an order in for patient to get Picc line placement. If you could call and get this scheduled up.    ThanksMoses  ----- Message -----  From: Evelyn Potts RN  Sent: 12/12/2018  12:27 PM  To: [facility] Nurse Pool  Subject: STACY/spoke with patient about obtaining port #    We are going to have difficulty with periph IV access for pt treatments. Please discuss options with patient-poss port, thanks evelyn

## 2018-12-14 NOTE — TELEPHONE ENCOUNTER
Called patient and talked with wife and informed her that I had mailed them information on obtain medical assistance with copay and travel to call the number on the card. I also brought up that the nurses next door had requested a picc line insertion and I heard he didn't want one. Informed wife that if they have trouble getting his iv next time to please let us know and we can set him up to get the picc line inserted. Patient's wife stated she understood but at this time he doesn't want it.

## 2018-12-18 NOTE — PROGRESS NOTES
Oncology Nutrition Screening    Patient Name:  Jose Antonio Do  YOB: 1955  MRN: [id number]  Date:  12/18/18  Physician:  Dr. Rose    Type of Cancer Treatment:   Chemotherapy:  Brentuximab - every 21 days     Patient Active Problem List   Diagnosis   • Erythroderma, Sezary (CMS/HCC)   • Sezary's syndrome (CMS/HCC)       Current Outpatient Medications   Medication Sig Dispense Refill   • Artificial Tear Ointment (REFRESH LACRI-LUBE OP) Apply 1 application to eye 3 (three) times a day     • dorzolamide-timolol (COSOPT) 22.3-6.8 MG/ML ophthalmic solution instill 1 drop into both eyes twice a day  1   • hydrochlorothiazide (MICROZIDE) 12.5 MG capsule Take 1 capsule by mouth Daily. 90 capsule 4   • losartan (COZAAR) 50 MG tablet Take 1 tablet by mouth Daily. 90 tablet 4   • ondansetron (ZOFRAN) 8 MG tablet Take 1 tablet by mouth 3 (Three) Times a Day As Needed for Nausea or Vomiting. 30 tablet 5   • prochlorperazine (COMPAZINE) 10 MG tablet Take 10 mg by mouth 3 (three) times a day     • raNITIdine (ZANTAC) 150 MG tablet Take 1 tablet by mouth Daily. 90 tablet 5   • sildenafil (VIAGRA) 50 MG tablet Take 1 tablet by mouth Daily As Needed for erectile dysfunction. 10 tablet 5     No current facility-administered medications for this visit.        Glycemic Risk:   n/a    Weight:   Height: 72 inches  Weight: 188 lbs.  Usual Body Weight: ~190 lbs.   BMI: 25.5  Overweight  Weight - will assess for recent changes at consult.    Oral Food Intake:  Regular Diet - No Restrictions    Hydration Status:   How many 8 ounce glass of water of fluid do you drink per day?  Will assess at time of consult.    Enteral Feeding:   n/a    Nutrition Symptoms:   No nutritional complaints reported.    Activity:   Not assessed at this time.     reports that he has been smoking.  He has been smoking about 0.50 packs per day. he has never used smokeless tobacco. He reports that he drinks about 3.6 oz of alcohol per week. He reports that  he uses drugs. Drug: Marijuana. Frequency: 14.00 times per week.    Evaluation of Nutritional Risk:   Patient has been identified at low nutritional risk due to diagnosis, treatment plan, and patient education.  Unable to meet with patient at his chemotherapy infusion appointment.  Will plan for nutritional consult at upcoming infusion appointment.    Electronically signed by:  Elda Colby RD  1:22 PM

## 2019-01-02 ENCOUNTER — OFFICE VISIT (OUTPATIENT)
Dept: ONCOLOGY | Facility: CLINIC | Age: 64
End: 2019-01-02

## 2019-01-02 ENCOUNTER — DOCUMENTATION (OUTPATIENT)
Dept: ONCOLOGY | Facility: CLINIC | Age: 64
End: 2019-01-02

## 2019-01-02 ENCOUNTER — APPOINTMENT (OUTPATIENT)
Dept: ONCOLOGY | Facility: HOSPITAL | Age: 64
End: 2019-01-02

## 2019-01-02 VITALS
OXYGEN SATURATION: 99 % | WEIGHT: 181 LBS | HEIGHT: 72 IN | DIASTOLIC BLOOD PRESSURE: 85 MMHG | BODY MASS INDEX: 24.52 KG/M2 | HEART RATE: 82 BPM | RESPIRATION RATE: 16 BRPM | TEMPERATURE: 97.7 F | SYSTOLIC BLOOD PRESSURE: 142 MMHG

## 2019-01-02 DIAGNOSIS — C84.10 SEZARY'S DISEASE, UNSPECIFIED BODY REGION (HCC): Primary | ICD-10-CM

## 2019-01-02 PROCEDURE — 99214 OFFICE O/P EST MOD 30 MIN: CPT | Performed by: INTERNAL MEDICINE

## 2019-01-02 RX ORDER — LOSARTAN POTASSIUM 50 MG/1
TABLET ORAL
Qty: 90 TABLET | Refills: 4 | Status: SHIPPED | OUTPATIENT
Start: 2019-01-02 | End: 2019-01-02 | Stop reason: SDUPTHER

## 2019-01-02 RX ORDER — LOSARTAN POTASSIUM 50 MG/1
50 TABLET ORAL DAILY
Qty: 90 TABLET | Refills: 4 | Status: SHIPPED | OUTPATIENT
Start: 2019-01-02 | End: 2020-03-24

## 2019-01-02 NOTE — PROGRESS NOTES
PROBLEM LIST:  Oncology/Hematology History    1. 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 Completed radiotherapy to the whole body by Dr. Lujan at the [facility] in 01/2014.   3. Progressed on methotrexate and weekly dexamethasone.   4. Completed 4 months of bexarotene with continued response. In 2014.  5. Skin changes related to treatment.  6.  Diagnosed by skin biopsy on 8/7/2013 performed at [place].  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  The lymphocytes were predominantly CD3 positive T cells with expression of both CD4 and CD8 stains.  CD30 stain highlights scattered cells within the infiltrate predominantly within the dermis.        Erythroderma, Sezary (CMS/[facility])    10/14/2013 Initial Diagnosis     Erythroderma, Sezary         12/12/2018 -  Chemotherapy     OP Hodgkin LYMPHOMA Brentuximab            Treatment     1. WBRT at  in Jan 2014  2. MTX and Dex   3. Bexarotene x 4 months 2014  4. Brentuximab - Started 12/2018            REASON FOR VISIT: Follow-up of my T-cell lymphoma    HISTORY OF PRESENT ILLNESS:   63-year-old gentleman with Sezary syndrome presents today for follow-up.  He is now 5 years out from his diagnosis.   he has progressive disease in Nov 2018.  He is on Brentuximab.  He completed 1 cycle of treatment.  Unfortunately he had some side effects that he doesn't want to have.  He had severe pain in his hands.  He also had severe fatigue.  He wants to discontinue all treatment.  He wants to reconsider in 3 months.    Past medical history, social history and family history was reviewed and unchanged from prior visit.    Review of Systems:    Review of Systems   Constitutional: Positive for fatigue.   HENT: Negative.    Eyes: Positive for redness.   Respiratory: Negative.    Cardiovascular: Negative.    Gastrointestinal: Negative.    Endocrine: Negative.  "   Genitourinary: Negative.    Musculoskeletal: Positive for arthralgias.   Skin: Positive for color change, rash and wound.   Allergic/Immunologic: Negative.    Neurological: Negative.    Hematological: Negative.       A comprehensive 14 point review of systems was performed and was negative except as mentioned.      Medications:  The current medication list was reviewed in the EMR    ALLERGIES:  No Known Allergies      Physical Exam    VITAL SIGNS:  Temp 97.7 °F (36.5 °C) (Temporal)   Resp 16   Ht 182.9 cm (72\")   Wt 82.1 kg (181 lb)   BMI 24.55 kg/m²      Performance Status: 1    General:  in no acute distress  HEENT: sclera anicteric, oropharynx clear, neck is supple  Lymphatics: no cervical, supraclavicular, or axillary adenopathy  Cardiovascular: regular rate and rhythm, no murmurs, rubs or gallops  Lungs: clear to auscultation bilaterally  Abdomen: soft, nontender, nondistended.  No palpable organomegaly  Extremities: no lower extremity edema  Skin: Significant erythroderma in his hands with peeling minimal flaking in the rest of his body.  Msk:  Shows no weakness of the large muscle groups  Psych: Mood is stable        RECENT LABS:    Lab Results   Component Value Date    WBC 8.10 12/12/2018    HGB 14.2 12/12/2018    HCT 42.8 12/12/2018    MCV 91.1 12/12/2018     12/12/2018     Lab Results   Component Value Date    GLUCOSE 89 12/12/2018    BUN 9 12/12/2018    CREATININE 0.95 12/12/2018    EGFRIFAFRI 97 12/12/2018    BCR 9.5 12/12/2018    K 4.2 12/12/2018    CO2 25.0 12/12/2018    CALCIUM 9.1 12/12/2018    ALBUMIN 4.26 12/12/2018    AST 21 12/12/2018    ALT 10 12/12/2018     Assessment/Plan    1. Stage IVA1 Sezary syndrome.  Per the patient's preference I'm going to discontinue Brentuximab.  I suspect he is having a nice response to it.  Unfortunately it does cause side effects.  I will discontinue all treatment and plan to reevaluate him in 3 months.  He has fair bit of erythroderma that's " ongoing.  I have recommended strongly against doing this.  I may have to consider bexarotene again.  He is willing to do that down the line.    2.  Sexual dysfunction: on Viagra    I spent 25 minutes on the patient's plan and care with more than 50% of time spent counseling the patient.    Neri Matute MD  [facility]    1/2/2019

## 2019-01-02 NOTE — PROGRESS NOTES
1/2/2019  Rec'd FMLA paperwork for pt's daughter, Agueda.  Signed MICHELLE  Tony collected $15 fee.    1/3/2019  Completed & put in MD basket.    1/4/2019  Rec'd with MD signature.  Faxed to [facility] @ fax# [phone]

## 2019-01-09 ENCOUNTER — TELEPHONE (OUTPATIENT)
Dept: SOCIAL WORK | Facility: HOSPITAL | Age: 64
End: 2019-01-09

## 2019-01-09 NOTE — TELEPHONE ENCOUNTER
SW called pt to address distress screening score of 6.  SW left a message requesting a call back at pt convenience.  SW available for ongoing support and resource needs.

## 2019-04-03 ENCOUNTER — OFFICE VISIT (OUTPATIENT)
Dept: ONCOLOGY | Facility: CLINIC | Age: 64
End: 2019-04-03

## 2019-04-03 VITALS
HEART RATE: 67 BPM | SYSTOLIC BLOOD PRESSURE: 158 MMHG | HEIGHT: 72 IN | WEIGHT: 178 LBS | RESPIRATION RATE: 16 BRPM | BODY MASS INDEX: 24.11 KG/M2 | TEMPERATURE: 97.3 F | DIASTOLIC BLOOD PRESSURE: 88 MMHG

## 2019-04-03 DIAGNOSIS — C84.10 SEZARY'S DISEASE, UNSPECIFIED BODY REGION (HCC): Primary | ICD-10-CM

## 2019-04-03 PROCEDURE — 99213 OFFICE O/P EST LOW 20 MIN: CPT | Performed by: INTERNAL MEDICINE

## 2019-04-03 NOTE — PROGRESS NOTES
PROBLEM LIST:  Oncology/Hematology History    1. 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 Completed radiotherapy to the whole body by Dr. Lujan at the [facility] in 01/2014.   3. Progressed on methotrexate and weekly dexamethasone.   4. Completed 4 months of bexarotene with continued response. In 2014.  5. Skin changes related to treatment.  6.  Diagnosed by skin biopsy on 8/7/2013 performed at [place].  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  The lymphocytes were predominantly CD3 positive T cells with expression of both CD4 and CD8 stains.  CD30 stain highlights scattered cells within the infiltrate predominantly within the dermis.        Erythroderma, Sezary (CMS/HCC)    10/14/2013 Initial Diagnosis     Erythroderma, Sezary         12/12/2018 -  Chemotherapy     OP Hodgkin LYMPHOMA Brentuximab            Treatment     1. WBRT at  in Jan 2014  2. MTX and Dex   3. Bexarotene x 4 months 2014  4. Brentuximab - Started 12/2018 - received 1 dose and discontinued it due to cost and side effects . Pt's Choice            REASON FOR VISIT: Follow-up of my T-cell lymphoma    HISTORY OF PRESENT ILLNESS:   64 y.o.  gentleman with Sezary syndrome presents today for follow-up.  He is now 5 years out from his diagnosis.   he has progressive disease in Nov 2018.  His skin changes have not significantly worsened.  Though he is not any better.  He does have a sinus issue due to allergies.  He has constant hearing in his eyes.  Partly due to involvement of his skin.    Past medical history, social history and family history was reviewed and unchanged from prior visit.    Review of Systems:    Review of Systems   Constitutional: Positive for fatigue.   HENT: Negative.    Eyes: Positive for redness.   Respiratory: Negative.    Cardiovascular: Negative.    Gastrointestinal: Negative.   "  Endocrine: Negative.    Genitourinary: Negative.    Musculoskeletal: Positive for arthralgias.   Skin: Positive for color change, rash and wound.   Allergic/Immunologic: Negative.    Neurological: Negative.    Hematological: Negative.       A comprehensive 14 point review of systems was performed and was negative except as mentioned.      Medications:  The current medication list was reviewed in the EMR    ALLERGIES:  No Known Allergies      Physical Exam    VITAL SIGNS:  /88 Comment: LUE  Pulse 67   Temp 97.3 °F (36.3 °C) (Temporal)   Resp 16   Ht 182.9 cm (72\")   Wt 80.7 kg (178 lb)   BMI 24.14 kg/m²      Performance Status: 1    General:  in no acute distress  HEENT: sclera anicteric, oropharynx clear, neck is supple, tearing  Lymphatics: no cervical, supraclavicular, or axillary adenopathy  Cardiovascular: regular rate and rhythm, no murmurs, rubs or gallops  Lungs: clear to auscultation bilaterally  Abdomen: soft, nontender, nondistended.  No palpable organomegaly  Extremities: no lower extremity edema  Skin: Significant erythroderma in his hands with peeling minimal flaking in the rest of his body.  Msk:  Shows no weakness of the large muscle groups  Psych: Mood is stable        RECENT LABS:    Lab Results   Component Value Date    WBC 8.10 12/12/2018    HGB 14.2 12/12/2018    HCT 42.8 12/12/2018    MCV 91.1 12/12/2018     12/12/2018     Lab Results   Component Value Date    GLUCOSE 89 12/12/2018    BUN 9 12/12/2018    CREATININE 0.95 12/12/2018    EGFRIFAFRI 97 12/12/2018    BCR 9.5 12/12/2018    K 4.2 12/12/2018    CO2 25.0 12/12/2018    CALCIUM 9.1 12/12/2018    ALBUMIN 4.26 12/12/2018    AST 21 12/12/2018    ALT 10 12/12/2018     Assessment/Plan    1. Stage IVA1 Sezary syndrome.  Per the patient's preference we discontinued Brentuximab.  He does not want any treatment at this time due to cost that is involved.  His co-pay for Brentuximab was fairly exorbitant.  I will see if our teamk " can help with co-pay assistance.  I think that may be a program through the company itself.  He does not want to be seen until October.  He will call me if he is getting any worse over the next several months.    2.  Sexual dysfunction: on Viagra    I spent 25 minutes on the patient's plan and care with more than 50% of time spent counseling the patient.    Neri Matute MD  [facility]    4/3/2019     Return on: 10/02/19  Return in (Approximately): 6 months

## 2019-10-02 ENCOUNTER — OFFICE VISIT (OUTPATIENT)
Dept: ONCOLOGY | Facility: CLINIC | Age: 64
End: 2019-10-02

## 2019-10-02 ENCOUNTER — LAB (OUTPATIENT)
Dept: LAB | Facility: HOSPITAL | Age: 64
End: 2019-10-02

## 2019-10-02 VITALS
TEMPERATURE: 97.9 F | RESPIRATION RATE: 16 BRPM | WEIGHT: 156 LBS | DIASTOLIC BLOOD PRESSURE: 86 MMHG | BODY MASS INDEX: 21.13 KG/M2 | HEIGHT: 72 IN | SYSTOLIC BLOOD PRESSURE: 132 MMHG | HEART RATE: 73 BPM

## 2019-10-02 DIAGNOSIS — C84.10 SEZARY'S DISEASE, UNSPECIFIED BODY REGION (HCC): Primary | ICD-10-CM

## 2019-10-02 DIAGNOSIS — C84.10 SEZARY'S DISEASE, UNSPECIFIED BODY REGION (HCC): ICD-10-CM

## 2019-10-02 LAB
ALBUMIN SERPL-MCNC: 4.3 G/DL (ref 3.5–5.2)
ALBUMIN/GLOB SERPL: 1.5 G/DL
ALP SERPL-CCNC: 88 U/L (ref 39–117)
ALT SERPL W P-5'-P-CCNC: 5 U/L (ref 1–41)
ANION GAP SERPL CALCULATED.3IONS-SCNC: 14 MMOL/L (ref 5–15)
AST SERPL-CCNC: 14 U/L (ref 1–40)
BILIRUB SERPL-MCNC: 0.5 MG/DL (ref 0.2–1.2)
BUN BLD-MCNC: 11 MG/DL (ref 8–23)
BUN/CREAT SERPL: 14.9 (ref 7–25)
CALCIUM SPEC-SCNC: 9.5 MG/DL (ref 8.6–10.5)
CHLORIDE SERPL-SCNC: 100 MMOL/L (ref 98–107)
CO2 SERPL-SCNC: 24 MMOL/L (ref 22–29)
CREAT BLD-MCNC: 0.74 MG/DL (ref 0.76–1.27)
ERYTHROCYTE [DISTWIDTH] IN BLOOD BY AUTOMATED COUNT: 13 % (ref 12.3–15.4)
GFR SERPL CREATININE-BSD FRML MDRD: 129 ML/MIN/1.73
GLOBULIN UR ELPH-MCNC: 2.9 GM/DL
GLUCOSE BLD-MCNC: 85 MG/DL (ref 65–99)
HCT VFR BLD AUTO: 43.7 % (ref 37.5–51)
HGB BLD-MCNC: 13.7 G/DL (ref 13–17.7)
LDH SERPL-CCNC: 159 U/L (ref 135–225)
LYMPHOCYTES # BLD AUTO: 0.8 10*3/MM3 (ref 0.7–3.1)
LYMPHOCYTES NFR BLD AUTO: 10.1 % (ref 19.6–45.3)
MCH RBC QN AUTO: 30.6 PG (ref 26.6–33)
MCHC RBC AUTO-ENTMCNC: 31.3 G/DL (ref 31.5–35.7)
MCV RBC AUTO: 97.9 FL (ref 79–97)
MONOCYTES # BLD AUTO: 1.2 10*3/MM3 (ref 0.1–0.9)
MONOCYTES NFR BLD AUTO: 16.2 % (ref 5–12)
NEUTROPHILS # BLD AUTO: 5.5 10*3/MM3 (ref 1.7–7)
NEUTROPHILS NFR BLD AUTO: 73.7 % (ref 42.7–76)
PLATELET # BLD AUTO: 264 10*3/MM3 (ref 140–450)
PMV BLD AUTO: 6.9 FL (ref 6–12)
POTASSIUM BLD-SCNC: 4.8 MMOL/L (ref 3.5–5.2)
PROT SERPL-MCNC: 7.2 G/DL (ref 6–8.5)
RBC # BLD AUTO: 4.46 10*6/MM3 (ref 4.14–5.8)
SODIUM BLD-SCNC: 138 MMOL/L (ref 136–145)
WBC NRBC COR # BLD: 7.5 10*3/MM3 (ref 3.4–10.8)

## 2019-10-02 PROCEDURE — 36415 COLL VENOUS BLD VENIPUNCTURE: CPT

## 2019-10-02 PROCEDURE — 99215 OFFICE O/P EST HI 40 MIN: CPT | Performed by: INTERNAL MEDICINE

## 2019-10-02 PROCEDURE — 85025 COMPLETE CBC W/AUTO DIFF WBC: CPT

## 2019-10-02 PROCEDURE — 80053 COMPREHEN METABOLIC PANEL: CPT

## 2019-10-02 PROCEDURE — 83615 LACTATE (LD) (LDH) ENZYME: CPT

## 2019-10-02 NOTE — PROGRESS NOTES
PROBLEM LIST:  Oncology/Hematology History    1. 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 Completed radiotherapy to the whole body by Dr. Lujan at the [facility] in 01/2014.   3. Progressed on methotrexate and weekly dexamethasone.   4. Completed 4 months of bexarotene with continued response. In 2014.  5. Skin changes related to treatment.  6.  Diagnosed by skin biopsy on 8/7/2013 performed at [place].  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  The lymphocytes were predominantly CD3 positive T cells with expression of both CD4 and CD8 stains.  CD30 stain highlights scattered cells within the infiltrate predominantly within the dermis.        Erythroderma, Sezary (CMS/HCC)    10/14/2013 Initial Diagnosis     Erythroderma, Sezary         12/12/2018 -  Chemotherapy     OP Hodgkin LYMPHOMA Brentuximab            Treatment     1. WBRT at  in Jan 2014  2. MTX and Dex   3. Bexarotene x 4 months 2014  4. Brentuximab - Started 12/2018 - received 1 dose and discontinued it due to cost and side effects . Pt's Choice            REASON FOR VISIT: Follow-up of my T-cell lymphoma    HISTORY OF PRESENT ILLNESS:   64 y.o.  gentleman with Sezary syndrome presents today for follow-up.  He is about 6 years out from his initial diagnosis.  He presents today with considerable worsening of his disease.  Now he is unable to ambulate due to involvement of his feet.  Over the last several years I have had multiple conversations with him about starting treatment.  However he is always declined it.  Finally last December I had treated him with brentuximab but after 1 treatment he decided to stop.  Today he presents with considerable worsening of his disease.  His performance status is declined considerably.      Past medical history, social history and family history was reviewed and unchanged from  "prior visit.    Review of Systems:    Review of Systems   Constitutional: Positive for fatigue.   HENT: Negative.    Eyes: Positive for redness.   Respiratory: Negative.    Cardiovascular: Negative.    Gastrointestinal: Negative.    Endocrine: Negative.    Genitourinary: Negative.    Musculoskeletal: Positive for arthralgias.   Skin: Positive for color change, rash and wound.   Allergic/Immunologic: Negative.    Neurological: Negative.    Hematological: Negative.       A comprehensive 14 point review of systems was performed and was negative except as mentioned.      Medications:  The current medication list was reviewed in the EMR    ALLERGIES:  No Known Allergies      Physical Exam    VITAL SIGNS:  /86 Comment: RUE  Pulse 73   Temp 97.9 °F (36.6 °C) (Temporal)   Resp 16   Ht 182.9 cm (72\")   Wt 70.8 kg (156 lb)   BMI 21.16 kg/m²      Performance Status: 2    General:  in no acute distress  HEENT: sclera anicteric, oropharynx clear, neck is supple, tearing  Lymphatics: no cervical, supraclavicular, or axillary adenopathy  Cardiovascular: regular rate and rhythm, no murmurs, rubs or gallops  Lungs: clear to auscultation bilaterally  Abdomen: soft, nontender, nondistended.  No palpable organomegaly  Extremities: no lower extremity edema  Skin: Significant worsening of his erythroderma.  His disease involves almost every aspect of his skin surface.  It is noticeably bad on his feet.  Msk:  Shows  weakness of the large muscle groups  Psych: Mood is stable        RECENT LABS:    Lab Results   Component Value Date    WBC 8.10 12/12/2018    HGB 14.2 12/12/2018    HCT 42.8 12/12/2018    MCV 91.1 12/12/2018     12/12/2018     Lab Results   Component Value Date    GLUCOSE 89 12/12/2018    BUN 9 12/12/2018    CREATININE 0.95 12/12/2018    EGFRIFAFRI 97 12/12/2018    BCR 9.5 12/12/2018    K 4.2 12/12/2018    CO2 25.0 12/12/2018    CALCIUM 9.1 12/12/2018    ALBUMIN 4.26 12/12/2018    AST 21 12/12/2018    ALT " 10 12/12/2018     Assessment/Plan    1. Stage IVA1 Sezary syndrome.  I had a long discussion with him and his daughter today.  I feel that he really needs to be treated with brentuximab again with a GALVEZ of 50%.  The other options include vorinostat which has fairly low response rates.  We can consider Moga which has a response rate of 37%.  We can also consider conventional chemotherapy such as Doxil with her response rate in the 40 to 50%.  Gemcitabine is also a possibility.  In spite of having a long conversation with the patient and his daughter. the daughter is fairly insistent on oral chemotherapy and not IV.  Though last time I gave him Bexarotene  he had considerable toxicity involving his hands that took years to heal up.  I have recommended they do a second opinion at  to see if there is any other options.  In the past our practice has also treated patients with immunotherapy with Keytruda.  Though there was fairly amazing response it was short-lived.   There are multiple ways to treat this disease.  However this cannot happen unless the patient decides he wants to be treated.  I will work on getting his bexarotene ordered in the meantime.  If they decide against any treatment then I recommend hospice care.      I spent a total of 40 minutes in direct patient care, greater than 25 minutes (greater than 50%) were spent in coordination of care, and counseling the patient regarding sensory syndrome. Answered any questions patient had with medication and plan.      Neri Matute MD  [facility]    10/2/2019

## 2019-10-16 ENCOUNTER — SPECIALTY PHARMACY (OUTPATIENT)
Dept: ONCOLOGY | Facility: HOSPITAL | Age: 64
End: 2019-10-16

## 2019-10-16 NOTE — PROGRESS NOTES
Received notification from  and team, patient has decided to keep treatment at . Oral chemo clinic signing off.

## 2020-03-24 RX ORDER — LOSARTAN POTASSIUM 50 MG/1
TABLET ORAL
Qty: 90 TABLET | Refills: 0 | Status: SHIPPED | OUTPATIENT
Start: 2020-03-24 | End: 2020-06-23 | Stop reason: SDUPTHER

## 2020-06-23 RX ORDER — LOSARTAN POTASSIUM 50 MG/1
50 TABLET ORAL DAILY
Qty: 90 TABLET | Refills: 0 | Status: SHIPPED | OUTPATIENT
Start: 2020-06-23 | End: 2020-12-30

## 2020-12-30 ENCOUNTER — OFFICE VISIT (OUTPATIENT)
Dept: INTERNAL MEDICINE | Facility: CLINIC | Age: 65
End: 2020-12-30

## 2020-12-30 VITALS
SYSTOLIC BLOOD PRESSURE: 132 MMHG | WEIGHT: 166.25 LBS | TEMPERATURE: 98.6 F | OXYGEN SATURATION: 98 % | HEART RATE: 78 BPM | RESPIRATION RATE: 20 BRPM | DIASTOLIC BLOOD PRESSURE: 78 MMHG | BODY MASS INDEX: 22.55 KG/M2

## 2020-12-30 DIAGNOSIS — I10 ESSENTIAL HYPERTENSION: ICD-10-CM

## 2020-12-30 DIAGNOSIS — C84.10 SEZARY'S DISEASE, UNSPECIFIED BODY REGION (HCC): Primary | ICD-10-CM

## 2020-12-30 PROCEDURE — 99214 OFFICE O/P EST MOD 30 MIN: CPT | Performed by: INTERNAL MEDICINE

## 2020-12-30 RX ORDER — AMMONIUM LACTATE 12 G/100G
LOTION TOPICAL AS NEEDED
Qty: 567 G | Refills: 3 | Status: SHIPPED | OUTPATIENT
Start: 2020-12-30

## 2020-12-30 RX ORDER — HYDROCHLOROTHIAZIDE 12.5 MG/1
12.5 CAPSULE, GELATIN COATED ORAL DAILY
Qty: 90 CAPSULE | Refills: 3 | Status: SHIPPED | OUTPATIENT
Start: 2020-12-30 | End: 2022-01-11

## 2020-12-30 NOTE — PROGRESS NOTES
Subjective   Jose Antonio Do is a 65 y.o. male.     History of Present Illness     The following portions of the patient's history were reviewed and updated as appropriate: allergies, current medications, past family history, past medical history, past social history, past surgical history and problem list.    Establish visit    1 hypertension-chronic stable.  Patient continues on current blood pressure medications and had no side effects.  He did have questions in regards to medication selection as he was not aware that he was not supposed to take his losartan anymore and continued on the hydrochlorothiazide.  He has continued on the hydrochlorothiazide and blood pressures are well controlled    2 sezary disease-chronic.  Patient says that with his specific skin condition he has extensive ichthyosis, dryness of the skin, peeling,      Review of Systems   All other systems reviewed and are negative.      Objective   Physical Exam  Vitals signs and nursing note reviewed.   Constitutional:       Appearance: Normal appearance. He is normal weight.   HENT:      Head: Normocephalic and atraumatic.      Nose: Nose normal.      Mouth/Throat:      Mouth: Mucous membranes are moist.   Eyes:      Extraocular Movements: Extraocular movements intact.      Conjunctiva/sclera: Conjunctivae normal.      Pupils: Pupils are equal, round, and reactive to light.   Neck:      Musculoskeletal: Normal range of motion and neck supple.   Cardiovascular:      Rate and Rhythm: Regular rhythm.      Pulses: Normal pulses.   Pulmonary:      Effort: Pulmonary effort is normal.      Breath sounds: Normal breath sounds.   Abdominal:      General: Abdomen is flat.   Skin:     General: Skin is warm.      Capillary Refill: Capillary refill takes less than 2 seconds.      Comments: Diffuse ichthyosis skin configuration on arms, legs, abdomen, neck, diffuse hyperpigmentation and scarring due to syndrome   Neurological:      Mental Status: He is alert.    Psychiatric:         Mood and Affect: Mood normal.         Behavior: Behavior normal.         Thought Content: Thought content normal.         Judgment: Judgment normal.           Assessment/Plan   Diagnoses and all orders for this visit:    1. Sezary's disease, unspecified body region (CMS/HCC) (Primary)  -     ammonium lactate (Lac-Hydrin) 12 % lotion; Apply  topically to the appropriate area as directed As Needed for Dry Skin.  Dispense: 567 g; Refill: 3  -     hydroCHLOROthiazide (MICROZIDE) 12.5 MG capsule; Take 1 capsule by mouth Daily.  Dispense: 90 capsule; Refill: 3    2. Essential hypertension-continue current diabetes medication.

## 2021-03-01 ENCOUNTER — OFFICE VISIT (OUTPATIENT)
Dept: INTERNAL MEDICINE | Facility: CLINIC | Age: 66
End: 2021-03-01

## 2021-03-01 VITALS
HEART RATE: 68 BPM | DIASTOLIC BLOOD PRESSURE: 86 MMHG | WEIGHT: 165.4 LBS | BODY MASS INDEX: 22.43 KG/M2 | OXYGEN SATURATION: 99 % | SYSTOLIC BLOOD PRESSURE: 134 MMHG | TEMPERATURE: 98.7 F

## 2021-03-01 DIAGNOSIS — C84.10 SEZARY'S DISEASE, UNSPECIFIED BODY REGION (HCC): Primary | ICD-10-CM

## 2021-03-01 DIAGNOSIS — M79.671 BILATERAL FOOT PAIN: ICD-10-CM

## 2021-03-01 DIAGNOSIS — M79.672 BILATERAL FOOT PAIN: ICD-10-CM

## 2021-03-01 PROCEDURE — 99214 OFFICE O/P EST MOD 30 MIN: CPT | Performed by: INTERNAL MEDICINE

## 2021-03-01 NOTE — PROGRESS NOTES
Subjective   Jose Antonio Do Jr. is a 65 y.o. male.     History of Present Illness     The following portions of the patient's history were reviewed and updated as appropriate: allergies, current medications, past family history, past medical history, past social history, past surgical history and problem list.    1 Sezarys syndrome-chronic stable.  Patient continues to have the chronic skin condition to which he did try the Lac-Hydrin cream and this provided only minimal relief and he still has great lengths of trying to cover his skin.    2 bilateral foot pain-patient has thickening of the skin due to his chronic condition and he is flat-footed and therefore has pain          Review of Systems   All other systems reviewed and are negative.      Objective   Physical Exam  Constitutional:       Appearance: Normal appearance. He is normal weight.   HENT:      Head: Normocephalic.      Right Ear: Tympanic membrane normal.      Left Ear: Tympanic membrane normal.      Nose: Nose normal.      Mouth/Throat:      Mouth: Mucous membranes are moist.   Eyes:      Extraocular Movements: Extraocular movements intact.      Pupils: Pupils are equal, round, and reactive to light.   Skin:     General: Skin is warm.      Capillary Refill: Capillary refill takes less than 2 seconds.      Comments: Ichthyotic, dry flakiness of the skin consistent with current skin condition   Neurological:      Mental Status: He is alert.   Psychiatric:         Mood and Affect: Mood normal.           Assessment/Plan   Diagnoses and all orders for this visit:    Spent 30 minutes face-to-face with patient, 25 minutes you specifically to address chronic skin condition and generate letter for authorization to receive the COVID-19 vaccine    1. Sezary's disease, unspecified body region (CMS/HCC) (Primary)-continue with current topical lotions    2. Bilateral foot pain  -     Ambulatory Referral to Podiatry

## 2021-09-13 ENCOUNTER — LAB (OUTPATIENT)
Dept: LAB | Facility: HOSPITAL | Age: 66
End: 2021-09-13

## 2021-09-13 ENCOUNTER — OFFICE VISIT (OUTPATIENT)
Dept: INTERNAL MEDICINE | Facility: CLINIC | Age: 66
End: 2021-09-13

## 2021-09-13 VITALS
TEMPERATURE: 98.6 F | WEIGHT: 170.13 LBS | DIASTOLIC BLOOD PRESSURE: 80 MMHG | BODY MASS INDEX: 23.07 KG/M2 | RESPIRATION RATE: 20 BRPM | SYSTOLIC BLOOD PRESSURE: 122 MMHG

## 2021-09-13 DIAGNOSIS — C84.10 SEZARY'S DISEASE, UNSPECIFIED BODY REGION (HCC): Primary | ICD-10-CM

## 2021-09-13 PROCEDURE — 87077 CULTURE AEROBIC IDENTIFY: CPT | Performed by: INTERNAL MEDICINE

## 2021-09-13 PROCEDURE — 99214 OFFICE O/P EST MOD 30 MIN: CPT | Performed by: INTERNAL MEDICINE

## 2021-09-13 PROCEDURE — 87147 CULTURE TYPE IMMUNOLOGIC: CPT | Performed by: INTERNAL MEDICINE

## 2021-09-13 PROCEDURE — 87070 CULTURE OTHR SPECIMN AEROBIC: CPT | Performed by: INTERNAL MEDICINE

## 2021-09-13 PROCEDURE — 87186 SC STD MICRODIL/AGAR DIL: CPT | Performed by: INTERNAL MEDICINE

## 2021-09-13 PROCEDURE — 87205 SMEAR GRAM STAIN: CPT | Performed by: INTERNAL MEDICINE

## 2021-09-13 RX ORDER — DOXYCYCLINE 100 MG/1
100 TABLET ORAL 2 TIMES DAILY
Qty: 28 TABLET | Refills: 0 | Status: SHIPPED | OUTPATIENT
Start: 2021-09-13 | End: 2021-09-27 | Stop reason: SDUPTHER

## 2021-09-13 NOTE — PROGRESS NOTES
Chief Complaint  sezary's disease (6 month follow up)    Subjective    Jose Antonio Do Jr. is a 66 y.o. male.     Jose Antonio Do Jr. presents to [facility] for       History of Present Illness    The following portions of the patient's history were reviewed and updated as appropriate: allergies, current medications, past family history, past medical history, past social history, past surgical history and problem list.     1 sezary syndrome/T cell lymphoma-chronic and unchanged.  Patient recently had a skin infection around his hand blistering, purulent drainage suspected of a skin infection such as strep or staph.  No fever, no chills, no nausea, no vomiting or diarrhea, no other sicknesses.    Review of Systems   All other systems reviewed and are negative.      Objective   Vital Signs:   /80   Temp 98.6 °F (37 °C) (Temporal)   Resp 20   Wt 77.2 kg (170 lb 2 oz)   BMI 23.07 kg/m²     Body mass index is 23.07 kg/m².    Physical Exam  Vitals and nursing note reviewed.   Constitutional:       Appearance: Normal appearance. He is normal weight.   HENT:      Head: Normocephalic and atraumatic.      Nose: Nose normal.      Mouth/Throat:      Mouth: Mucous membranes are moist.   Eyes:      Extraocular Movements: Extraocular movements intact.      Pupils: Pupils are equal, round, and reactive to light.   Cardiovascular:      Rate and Rhythm: Normal rate.      Pulses: Normal pulses.      Heart sounds: Normal heart sounds.   Pulmonary:      Effort: Pulmonary effort is normal.      Breath sounds: Normal breath sounds.   Abdominal:      General: Bowel sounds are normal.      Palpations: Abdomen is soft.   Musculoskeletal:         General: Normal range of motion.      Cervical back: Normal range of motion and neck supple.   Skin:     General: Skin is warm.      Capillary Refill: Capillary refill takes less than 2 seconds.   Neurological:      General: No focal deficit  present.      Mental Status: He is alert.   Psychiatric:         Mood and Affect: Mood normal.         Behavior: Behavior normal.         Thought Content: Thought content normal.         Judgment: Judgment normal.               Assessment and Plan  Diagnoses and all orders for this visit:    Diagnoses and all orders for this visit:    1. Sezary's disease, unspecified body region (CMS/HCC) (Primary)  -     doxycycline (ADOXA) 100 MG tablet; Take 1 tablet by mouth 2 (Two) Times a Day.  Dispense: 28 tablet; Refill: 0  -     Wound Culture - Wound, Finger

## 2021-09-16 ENCOUNTER — TELEPHONE (OUTPATIENT)
Dept: INTERNAL MEDICINE | Facility: CLINIC | Age: 66
End: 2021-09-16

## 2021-09-16 NOTE — TELEPHONE ENCOUNTER
----- Message from Gary Branch MD sent at 9/16/2021 12:57 PM EDT -----  Regarding: Finger/wound culture  Please tell patient that his infection around his fingers did grow out MRSA which is the resistant staph aureus.The antibiotic that he is on will cover it.  The doxycycline will treat MRSA

## 2021-09-16 NOTE — TELEPHONE ENCOUNTER
Patient has been given providers message and advised to call back if no better after treatment. Patient verb good understanding.

## 2021-09-17 LAB
BACTERIA SPEC AEROBE CULT: ABNORMAL
GRAM STN SPEC: ABNORMAL
GRAM STN SPEC: ABNORMAL

## 2021-09-27 ENCOUNTER — OFFICE VISIT (OUTPATIENT)
Dept: INTERNAL MEDICINE | Facility: CLINIC | Age: 66
End: 2021-09-27

## 2021-09-27 VITALS
WEIGHT: 171.25 LBS | RESPIRATION RATE: 20 BRPM | SYSTOLIC BLOOD PRESSURE: 120 MMHG | TEMPERATURE: 97.3 F | BODY MASS INDEX: 23.23 KG/M2 | DIASTOLIC BLOOD PRESSURE: 80 MMHG

## 2021-09-27 DIAGNOSIS — C84.10 SEZARY'S DISEASE, UNSPECIFIED BODY REGION (HCC): ICD-10-CM

## 2021-09-27 DIAGNOSIS — I10 ESSENTIAL HYPERTENSION: Primary | ICD-10-CM

## 2021-09-27 PROCEDURE — 99213 OFFICE O/P EST LOW 20 MIN: CPT | Performed by: INTERNAL MEDICINE

## 2021-09-27 RX ORDER — DOXYCYCLINE 100 MG/1
100 TABLET ORAL 2 TIMES DAILY
Qty: 28 TABLET | Refills: 0 | Status: SHIPPED | OUTPATIENT
Start: 2021-09-27 | End: 2021-11-15 | Stop reason: SDUPTHER

## 2021-09-27 RX ORDER — HYDROCODONE BITARTRATE AND ACETAMINOPHEN 7.5; 325 MG/1; MG/1
1 TABLET ORAL EVERY 6 HOURS PRN
Qty: 30 TABLET | Refills: 0 | Status: SHIPPED | OUTPATIENT
Start: 2021-09-27 | End: 2021-11-15

## 2021-11-15 ENCOUNTER — TELEPHONE (OUTPATIENT)
Dept: INTERNAL MEDICINE | Facility: CLINIC | Age: 66
End: 2021-11-15

## 2021-11-15 ENCOUNTER — TELEMEDICINE (OUTPATIENT)
Dept: INTERNAL MEDICINE | Facility: CLINIC | Age: 66
End: 2021-11-15

## 2021-11-15 DIAGNOSIS — C84.10 SEZARY'S DISEASE, UNSPECIFIED BODY REGION (HCC): ICD-10-CM

## 2021-11-15 DIAGNOSIS — M79.672 BILATERAL FOOT PAIN: Primary | ICD-10-CM

## 2021-11-15 DIAGNOSIS — G89.29 OTHER CHRONIC PAIN: ICD-10-CM

## 2021-11-15 DIAGNOSIS — M79.671 BILATERAL FOOT PAIN: Primary | ICD-10-CM

## 2021-11-15 PROCEDURE — 99213 OFFICE O/P EST LOW 20 MIN: CPT | Performed by: INTERNAL MEDICINE

## 2021-11-15 RX ORDER — HYDROCODONE BITARTRATE AND ACETAMINOPHEN 10; 325 MG/1; MG/1
1 TABLET ORAL EVERY 6 HOURS PRN
Qty: 60 TABLET | Refills: 0 | Status: SHIPPED | OUTPATIENT
Start: 2021-11-15

## 2021-11-15 RX ORDER — DOXYCYCLINE 100 MG/1
100 TABLET ORAL 2 TIMES DAILY
Qty: 28 TABLET | Refills: 0 | Status: SHIPPED | OUTPATIENT
Start: 2021-11-15 | End: 2022-06-24 | Stop reason: SDUPTHER

## 2021-11-15 NOTE — TELEPHONE ENCOUNTER
ARIC PT'S DAUGHTER DROPPED OFF FMLA PAPERWORK PLEASE FAX PAPERWORK IN AND CALL PT TO LET HER KNOW WHEN FAXED [phone]  PAPERWORK IN MAILBOX UP FRONT

## 2021-11-15 NOTE — PROGRESS NOTES
Chief Complaint  No chief complaint on file.    Subjective    Jose Antonio Do Jr. is a 66 y.o. male.     Jose Antonio Do Jr. presents to [facility] for       History of Present Illness    The following portions of the patient's history were reviewed and updated as appropriate: allergies, current medications, past family history, past medical history, past social history, past surgical history and problem list.      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            Review of Systems   All other systems reviewed and are negative.      Objective   Vital Signs:   There were no vitals taken for this visit.    There is no height or weight on file to calculate BMI.    Physical Exam  Vitals and nursing note reviewed.   Constitutional:       Appearance: He is normal weight.   HENT:      Head: Normocephalic and atraumatic.      Nose: Nose normal.      Mouth/Throat:      Mouth: Mucous membranes are moist.   Skin:     General: Skin is warm.      Comments: Chronic skin condition   Neurological:      Mental Status: He is alert.   Psychiatric:         Mood and Affect: Mood normal.         Behavior: Behavior normal.         Thought Content: Thought content normal.         Judgment: Judgment normal.          Sezary's syndrome      Chronic pain            Assessment and Plan  Diagnoses and all orders for this visit:        Diagnoses and all orders for this visit:    1. Bilateral foot pain (Primary)-continue on chronic pain medication.    2. Sezary's disease, unspecified body region (HCC)  -     doxycycline (ADOXA) 100 MG tablet; Take 1 tablet by mouth 2 (Two) Times a Day.  Dispense: 28 tablet; Refill: 0    3. Other chronic pain  -      HYDROcodone-acetaminophen (NORCO)  MG per tablet; Take 1 tablet by mouth Every 6 (Six) Hours As Needed for Moderate Pain .  Dispense: 60 tablet; Refill: 0

## 2022-01-11 DIAGNOSIS — C84.10 SEZARY'S DISEASE, UNSPECIFIED BODY REGION: ICD-10-CM

## 2022-01-11 RX ORDER — HYDROCHLOROTHIAZIDE 12.5 MG/1
12.5 CAPSULE, GELATIN COATED ORAL DAILY
Qty: 90 CAPSULE | Refills: 3 | Status: SHIPPED | OUTPATIENT
Start: 2022-01-11

## 2022-02-14 ENCOUNTER — TELEMEDICINE (OUTPATIENT)
Dept: INTERNAL MEDICINE | Facility: CLINIC | Age: 67
End: 2022-02-14

## 2022-02-14 DIAGNOSIS — C84.10 SEZARY'S DISEASE, UNSPECIFIED BODY REGION: Primary | ICD-10-CM

## 2022-02-14 PROCEDURE — 99212 OFFICE O/P EST SF 10 MIN: CPT | Performed by: INTERNAL MEDICINE

## 2022-02-14 NOTE — PROGRESS NOTES
Chief Complaint  No chief complaint on file.    Subjective    Jose Antonio Do Jr. is a 66 y.o. male.     Jose Antonio Do Jr. presents to [facility] for       History of Present Illness    The following portions of the patient's history were reviewed and updated as appropriate: allergies, current medications, past family history, past medical history, past social history, past surgical history and problem list.    Patient has consented for video MyChart    1 Zexay Syndome= chronic and stable.  Patient continues appropriate skin care to prevent any further breaks in skin and this has been doing very well for patient.    Review of Systems   All other systems reviewed and are negative.      Objective   Vital Signs:   There were no vitals taken for this visit.    There is no height or weight on file to calculate BMI.    Physical Exam  Vitals and nursing note reviewed.   Constitutional:       Appearance: Normal appearance. He is normal weight.   HENT:      Head: Normocephalic and atraumatic.      Nose: Nose normal.      Mouth/Throat:      Mouth: Mucous membranes are moist.   Eyes:      Pupils: Pupils are equal, round, and reactive to light.   Neurological:      General: No focal deficit present.      Mental Status: He is alert.   Psychiatric:         Mood and Affect: Mood normal.         Behavior: Behavior normal.         Thought Content: Thought content normal.         Judgment: Judgment normal.               Assessment and Plan  Diagnoses and all orders for this visit:          formation pulled into the AVS if appropriate.      Diagnoses and all orders for this visit:    Spent approximately 15 minutes face-to-face via [employer] video    1. Sezary's disease, unspecified body region (HCC) (Primary)    Patient will continue with appropriate skin management and follow-up as needed.    Return to clinic in 5 months

## 2022-06-22 ENCOUNTER — TELEPHONE (OUTPATIENT)
Dept: INTERNAL MEDICINE | Facility: CLINIC | Age: 67
End: 2022-06-22

## 2022-06-22 DIAGNOSIS — C84.10 SEZARY'S DISEASE, UNSPECIFIED BODY REGION: ICD-10-CM

## 2022-06-22 NOTE — TELEPHONE ENCOUNTER
Caller: Aileen oD    Relationship: Emergency Contact    Best call back number: [phone]    What medication are you requesting: PATIENT IS REQUESTING AN ANTIBIOTIC    What are your current symptoms: INFECTION ON FINGER      Have you had these symptoms before:    [x] Yes  [] No    Have you been treated for these symptoms before:   [x] Yes  [] No    If a prescription is needed, what is your preferred pharmacy and phone number: [facility] #[id number] [address] AT [facility] [phone] [facility] [phone] FX

## 2022-06-23 NOTE — TELEPHONE ENCOUNTER
Caller: Aileen Do    Relationship: Emergency Contact    Best call back number: [phone]    What medication are you requesting: ANTIBIOTIC     What are your current symptoms: INFECTION ON FINGER     How long have you been experiencing symptoms: ALMOST TWO MONTHS     Have you had these symptoms before:    [x] Yes  [] No    Have you been treated for these symptoms before:   [x] Yes  [] No    If a prescription is needed, what is your preferred pharmacy and phone number: [facility] #[id number] - [place] [address] AT [facility] [phone] [facility] [phone] FX     Additional notes:PATIENT'S DAUGHTER STATES PCP HAS PRESCRIBED MEDICATION FOR THIS IN THE PAST BUT THE INFECTION IS COMING BACK. PATIENT'S DAUGHTER WOULD LIKE TO CHECK IF PCP IS GOING TO SEND AN ANTIBIOTIC IN FOR THE PATIENT.     CALLBACK: YES

## 2022-06-24 RX ORDER — DOXYCYCLINE 100 MG/1
100 TABLET ORAL 2 TIMES DAILY
Qty: 28 TABLET | Refills: 0 | Status: SHIPPED | OUTPATIENT
Start: 2022-06-24

## 2022-07-12 ENCOUNTER — TELEPHONE (OUTPATIENT)
Dept: INTERNAL MEDICINE | Facility: CLINIC | Age: 67
End: 2022-07-12

## 2022-07-12 NOTE — TELEPHONE ENCOUNTER
Rx was sent in 6/24/22. Patient notified and stated they checked with the pharmacy and they did have it ready.

## 2022-07-12 NOTE — TELEPHONE ENCOUNTER
Caller: Aric Do    Relationship to patient: Emergency Contact    Best call back number:[phone]    Patient was needing : ARIC CALLED AND STATED THAT PHARMACY HAS NOT RECEIVED ANY MEDICATIONS ON PATIENT BACK IN June WHEN PATIENT CALLED TO  REQUEST ANTIBIOTICS FOR HIS HANDS AND JUST WANTED TO CHECK TO SEE IF THERE WAS ANYTHING THAT WAS CALLED INTO PHARMACY     PLEASE ADVISE

## 2022-08-02 ENCOUNTER — TELEPHONE (OUTPATIENT)
Dept: INTERNAL MEDICINE | Facility: CLINIC | Age: 67
End: 2022-08-02

## 2022-08-02 DIAGNOSIS — M79.672 BILATERAL FOOT PAIN: ICD-10-CM

## 2022-08-02 DIAGNOSIS — C84.10 SEZARY'S DISEASE, UNSPECIFIED BODY REGION: Primary | ICD-10-CM

## 2022-08-02 DIAGNOSIS — G89.29 OTHER CHRONIC PAIN: ICD-10-CM

## 2022-08-02 DIAGNOSIS — M79.671 BILATERAL FOOT PAIN: ICD-10-CM

## 2022-08-02 NOTE — TELEPHONE ENCOUNTER
PATIENT DAUGHTER WOULD LIKE REFERRAL TO PUT PATIENT IN SKILLED NURSING FACILITY.      PLEASE CALL [phone]

## 2022-08-09 NOTE — TELEPHONE ENCOUNTER
Tried to reach daughter no answer left message on voicemail advising referral was placed and if she had any other questions or concerns she could call back.

## 2022-08-09 NOTE — TELEPHONE ENCOUNTER
I have put in a social work consult and they can work with the family to address their concerns of skilled nursing

## 2022-09-13 DIAGNOSIS — C84.10 SEZARY'S DISEASE, UNSPECIFIED BODY REGION: Primary | ICD-10-CM

## 2022-09-13 DIAGNOSIS — M79.671 BILATERAL FOOT PAIN: ICD-10-CM

## 2022-09-13 DIAGNOSIS — M79.672 BILATERAL FOOT PAIN: ICD-10-CM

## 2022-09-13 DIAGNOSIS — G89.29 OTHER CHRONIC PAIN: ICD-10-CM

## 2022-09-14 ENCOUNTER — REFERRAL TRIAGE (OUTPATIENT)
Dept: CASE MANAGEMENT | Facility: OTHER | Age: 67
End: 2022-09-14

## 2022-09-29 ENCOUNTER — PATIENT OUTREACH (OUTPATIENT)
Dept: CASE MANAGEMENT | Facility: OTHER | Age: 67
End: 2022-09-29

## 2022-09-29 NOTE — OUTREACH NOTE
SW made four attempts to contact family but was unable to establish contact. SW will close referral at this time.    GREGORY ROSENBERG -   Ambulatory Case Management    9/29/2022, 10:44 EDT